# Patient Record
Sex: MALE | Race: ASIAN | NOT HISPANIC OR LATINO | URBAN - METROPOLITAN AREA
[De-identification: names, ages, dates, MRNs, and addresses within clinical notes are randomized per-mention and may not be internally consistent; named-entity substitution may affect disease eponyms.]

---

## 2023-04-11 ENCOUNTER — TELEPHONE (OUTPATIENT)
Dept: PRIMARY CARE | Facility: CLINIC | Age: 58
End: 2023-04-11

## 2024-05-06 ENCOUNTER — OFFICE VISIT (OUTPATIENT)
Dept: PRIMARY CARE | Facility: CLINIC | Age: 59
End: 2024-05-06
Payer: MEDICARE

## 2024-05-06 VITALS
OXYGEN SATURATION: 97 % | TEMPERATURE: 97.9 F | RESPIRATION RATE: 16 BRPM | WEIGHT: 155 LBS | BODY MASS INDEX: 25.83 KG/M2 | SYSTOLIC BLOOD PRESSURE: 120 MMHG | DIASTOLIC BLOOD PRESSURE: 76 MMHG | HEART RATE: 86 BPM | HEIGHT: 65 IN

## 2024-05-06 DIAGNOSIS — M10.472 ACUTE GOUT DUE TO OTHER SECONDARY CAUSE INVOLVING TOE OF LEFT FOOT: Primary | ICD-10-CM

## 2024-05-06 PROCEDURE — 1036F TOBACCO NON-USER: CPT

## 2024-05-06 PROCEDURE — 99213 OFFICE O/P EST LOW 20 MIN: CPT

## 2024-05-06 RX ORDER — NAPROXEN 500 MG/1
500 TABLET ORAL
Qty: 60 TABLET | Refills: 0 | Status: SHIPPED | OUTPATIENT
Start: 2024-05-06 | End: 2024-06-03

## 2024-05-06 RX ORDER — NAPROXEN 500 MG/1
500 TABLET ORAL
COMMUNITY
Start: 2021-12-21 | End: 2024-05-06 | Stop reason: SDUPTHER

## 2024-05-06 RX ORDER — ALLOPURINOL 100 MG/1
100 TABLET ORAL DAILY
Qty: 30 TABLET | Refills: 11 | Status: SHIPPED | OUTPATIENT
Start: 2024-05-06 | End: 2025-05-06

## 2024-05-06 ASSESSMENT — ENCOUNTER SYMPTOMS
APPETITE CHANGE: 0
CHILLS: 0
FEVER: 0
ACTIVITY CHANGE: 0
FATIGUE: 0

## 2024-05-06 NOTE — PROGRESS NOTES
I reviewed the resident/fellow's documentation and discussed the patient with the resident. I agree with the resident medical decision making as documented in the note.   Patient has a long history of this.  Naproxen usually helps.  It is in the same.  See medication.  With arthritis start the allopurinol.  He is aware of the side effects and complications of medications he been on in the past and they have been fine.  Will refill his meds patient aware if any chest pain shortness of breath any nausea vomiting diarrhea fever headache any concerning symptoms any fever chills go to the ER.  No signs of cellulitis per the resident.  Patient's follow-up in 1 month  Agree with assessment plan  Sanchez Tan, DO

## 2024-05-06 NOTE — PATIENT INSTRUCTIONS
Don,   Good to meet you today.     Take the naproxen twice daily with meals.     When the pain improves, restart the allopurinol once daily and stop the naproxen.

## 2024-05-06 NOTE — PROGRESS NOTES
"Subjective   Patient ID: [id number]   Merritt Quitnanilla is a 58 y.o. male who presents for Gout.  Patient is here with gout flare that started 3 days ago over the weekend.  He states he had eaten an increased amount of red meat and seafood the days preceding this.  He has been taking naproxen which has improved the symptoms but has not resolved them.  He previously was taking allopurinol but stopped when he ran out about a year ago.  He denies fever/chills, dyspnea, nausea/vomiting, or any other joint pain.         Review of Systems   Constitutional:  Negative for activity change, appetite change, chills, fatigue and fever.   Musculoskeletal:         Gout flare       Objective   /76 (BP Location: Left arm, Patient Position: Sitting, BP Cuff Size: Adult)   Pulse 86   Temp 36.6 °C (97.9 °F)   Resp 16   Ht 1.638 m (5' 4.5\")   Wt 70.3 kg (155 lb)   SpO2 97%   BMI 26.19 kg/m²    Physical Exam  Vitals and nursing note reviewed.   Constitutional:       General: He is not in acute distress.     Appearance: Normal appearance. He is not ill-appearing or toxic-appearing.   HENT:      Head: Normocephalic and atraumatic.      Right Ear: External ear normal.      Left Ear: External ear normal.      Nose: Nose normal.      Mouth/Throat:      Mouth: Mucous membranes are moist.   Eyes:      Extraocular Movements: Extraocular movements intact.   Cardiovascular:      Rate and Rhythm: Normal rate and regular rhythm.   Pulmonary:      Effort: Pulmonary effort is normal. No respiratory distress.      Breath sounds: Normal breath sounds. No stridor. No wheezing or rhonchi.   Abdominal:      General: Abdomen is flat.      Tenderness: There is no abdominal tenderness. There is no guarding or rebound.   Musculoskeletal:         General: Normal range of motion.        Feet:    Feet:      Left foot:      Skin integrity: Erythema and warmth present.      Comments: Tender to palpation left great toe   Skin:     General: Skin is warm " and dry.      Findings: No rash.   Neurological:      General: No focal deficit present.      Mental Status: He is alert and oriented to person, place, and time.         Assessment/Plan   Problem List Items Addressed This Visit    None  Visit Diagnoses       Acute gout due to other secondary cause involving toe of left foot    -  Primary    Relevant Medications    naproxen (Naprosyn) 500 mg tablet    allopurinol (Zyloprim) 100 mg tablet            Presentation consistent with podagra.  Will refill naproxen and allopurinol.  Instructed patient to continue naproxen twice daily until pain is improved then restart allopurinol.  He will follow-up in 1 to 2 months for physical, should check uric acid at that point and titrate allopurinol accordingly.     Discussed with attending physician Dr. Tan.       Bryce He,

## 2024-06-02 DIAGNOSIS — M10.472 ACUTE GOUT DUE TO OTHER SECONDARY CAUSE INVOLVING TOE OF LEFT FOOT: ICD-10-CM

## 2024-06-03 RX ORDER — NAPROXEN 500 MG/1
500 TABLET ORAL
Qty: 60 TABLET | Refills: 0 | Status: SHIPPED | OUTPATIENT
Start: 2024-06-03 | End: 2024-07-03

## 2025-03-27 DIAGNOSIS — M10.472 ACUTE GOUT DUE TO OTHER SECONDARY CAUSE INVOLVING TOE OF LEFT FOOT: ICD-10-CM

## 2025-03-27 RX ORDER — ALLOPURINOL 100 MG/1
100 TABLET ORAL DAILY
Qty: 90 TABLET | Refills: 3 | Status: SHIPPED | OUTPATIENT
Start: 2025-03-27